# Patient Record
Sex: FEMALE | Race: WHITE | NOT HISPANIC OR LATINO
[De-identification: names, ages, dates, MRNs, and addresses within clinical notes are randomized per-mention and may not be internally consistent; named-entity substitution may affect disease eponyms.]

---

## 2017-06-27 ENCOUNTER — TRANSCRIPTION ENCOUNTER (OUTPATIENT)
Age: 32
End: 2017-06-27

## 2017-06-27 ENCOUNTER — EMERGENCY (EMERGENCY)
Facility: HOSPITAL | Age: 32
LOS: 1 days | Discharge: HOME | End: 2017-06-27
Attending: EMERGENCY MEDICINE | Admitting: EMERGENCY MEDICINE
Payer: COMMERCIAL

## 2017-06-27 VITALS
DIASTOLIC BLOOD PRESSURE: 54 MMHG | TEMPERATURE: 101 F | SYSTOLIC BLOOD PRESSURE: 115 MMHG | OXYGEN SATURATION: 100 % | HEART RATE: 120 BPM | RESPIRATION RATE: 20 BRPM

## 2017-06-27 LAB
ALBUMIN SERPL ELPH-MCNC: 4.6 G/DL — SIGNIFICANT CHANGE UP (ref 3.3–5)
ALP SERPL-CCNC: 59 U/L — SIGNIFICANT CHANGE UP (ref 40–120)
ALT FLD-CCNC: 14 U/L RC — SIGNIFICANT CHANGE UP (ref 10–45)
ANION GAP SERPL CALC-SCNC: 14 MMOL/L — SIGNIFICANT CHANGE UP (ref 5–17)
AST SERPL-CCNC: 21 U/L — SIGNIFICANT CHANGE UP (ref 10–40)
BASOPHILS # BLD AUTO: 0 K/UL — SIGNIFICANT CHANGE UP (ref 0–0.2)
BASOPHILS NFR BLD AUTO: 0.1 % — SIGNIFICANT CHANGE UP (ref 0–2)
BILIRUB SERPL-MCNC: 0.4 MG/DL — SIGNIFICANT CHANGE UP (ref 0.2–1.2)
BUN SERPL-MCNC: 8 MG/DL — SIGNIFICANT CHANGE UP (ref 7–23)
CALCIUM SERPL-MCNC: 9.2 MG/DL — SIGNIFICANT CHANGE UP (ref 8.4–10.5)
CHLORIDE SERPL-SCNC: 101 MMOL/L — SIGNIFICANT CHANGE UP (ref 96–108)
CO2 SERPL-SCNC: 22 MMOL/L — SIGNIFICANT CHANGE UP (ref 22–31)
CREAT SERPL-MCNC: 0.77 MG/DL — SIGNIFICANT CHANGE UP (ref 0.5–1.3)
EOSINOPHIL # BLD AUTO: 0 K/UL — SIGNIFICANT CHANGE UP (ref 0–0.5)
EOSINOPHIL NFR BLD AUTO: 0 % — SIGNIFICANT CHANGE UP (ref 0–6)
GLUCOSE SERPL-MCNC: 138 MG/DL — HIGH (ref 70–99)
HCG UR QL: NEGATIVE — SIGNIFICANT CHANGE UP
HCT VFR BLD CALC: 38.5 % — SIGNIFICANT CHANGE UP (ref 34.5–45)
HGB BLD-MCNC: 13.4 G/DL — SIGNIFICANT CHANGE UP (ref 11.5–15.5)
LIDOCAIN IGE QN: 34 U/L — SIGNIFICANT CHANGE UP (ref 7–60)
LYMPHOCYTES # BLD AUTO: 0.7 K/UL — LOW (ref 1–3.3)
LYMPHOCYTES # BLD AUTO: 7.4 % — LOW (ref 13–44)
MCHC RBC-ENTMCNC: 30.6 PG — SIGNIFICANT CHANGE UP (ref 27–34)
MCHC RBC-ENTMCNC: 34.8 GM/DL — SIGNIFICANT CHANGE UP (ref 32–36)
MCV RBC AUTO: 87.8 FL — SIGNIFICANT CHANGE UP (ref 80–100)
MONOCYTES # BLD AUTO: 0.6 K/UL — SIGNIFICANT CHANGE UP (ref 0–0.9)
MONOCYTES NFR BLD AUTO: 6.3 % — SIGNIFICANT CHANGE UP (ref 2–14)
NEUTROPHILS # BLD AUTO: 7.8 K/UL — HIGH (ref 1.8–7.4)
NEUTROPHILS NFR BLD AUTO: 86.3 % — HIGH (ref 43–77)
PLATELET # BLD AUTO: 167 K/UL — SIGNIFICANT CHANGE UP (ref 150–400)
POTASSIUM SERPL-MCNC: 4.4 MMOL/L — SIGNIFICANT CHANGE UP (ref 3.5–5.3)
POTASSIUM SERPL-SCNC: 4.4 MMOL/L — SIGNIFICANT CHANGE UP (ref 3.5–5.3)
PROT SERPL-MCNC: 7.7 G/DL — SIGNIFICANT CHANGE UP (ref 6–8.3)
RBC # BLD: 4.39 M/UL — SIGNIFICANT CHANGE UP (ref 3.8–5.2)
RBC # FLD: 11.8 % — SIGNIFICANT CHANGE UP (ref 10.3–14.5)
S PYO AG SPEC QL IA: NEGATIVE — SIGNIFICANT CHANGE UP
SODIUM SERPL-SCNC: 137 MMOL/L — SIGNIFICANT CHANGE UP (ref 135–145)
WBC # BLD: 9 K/UL — SIGNIFICANT CHANGE UP (ref 3.8–10.5)
WBC # FLD AUTO: 9 K/UL — SIGNIFICANT CHANGE UP (ref 3.8–10.5)

## 2017-06-27 PROCEDURE — 93010 ELECTROCARDIOGRAM REPORT: CPT | Mod: NC

## 2017-06-27 PROCEDURE — 99284 EMERGENCY DEPT VISIT MOD MDM: CPT | Mod: 25

## 2017-06-27 RX ORDER — ACETAMINOPHEN 500 MG
1000 TABLET ORAL ONCE
Qty: 0 | Refills: 0 | Status: COMPLETED | OUTPATIENT
Start: 2017-06-27 | End: 2017-06-27

## 2017-06-27 RX ORDER — ONDANSETRON 8 MG/1
4 TABLET, FILM COATED ORAL ONCE
Qty: 0 | Refills: 0 | Status: COMPLETED | OUTPATIENT
Start: 2017-06-27 | End: 2017-06-27

## 2017-06-27 RX ORDER — SODIUM CHLORIDE 9 MG/ML
2000 INJECTION INTRAMUSCULAR; INTRAVENOUS; SUBCUTANEOUS ONCE
Qty: 0 | Refills: 0 | Status: COMPLETED | OUTPATIENT
Start: 2017-06-27 | End: 2017-06-27

## 2017-06-27 RX ADMIN — Medication 400 MILLIGRAM(S): at 23:15

## 2017-06-27 RX ADMIN — SODIUM CHLORIDE 2000 MILLILITER(S): 9 INJECTION INTRAMUSCULAR; INTRAVENOUS; SUBCUTANEOUS at 23:15

## 2017-06-27 RX ADMIN — ONDANSETRON 4 MILLIGRAM(S): 8 TABLET, FILM COATED ORAL at 23:15

## 2017-06-27 NOTE — ED PROVIDER NOTE - OBJECTIVE STATEMENT
Attending Massey: 31 y/o female without sig pmh presenting with weakness and syncopal episode. pt states today did not feel well, sore throat and diarrhea. multiple episodes of diarrhea. no reported blood. felt weak and lightheaded and had a syncopal episode. did not hit her head. afterward just felt weak. no back pain. later in the day did have an episodes of vomiting. went to urgent care and had strep testing which was negative and given tyelonl. felt better after tylenol. no black o rbloody stools no recet travel. mom is now having similar symtoms. no h/o cardiac disease.

## 2017-06-27 NOTE — ED PROVIDER NOTE - PROGRESS NOTE DETAILS
Attending Massey: pt felt better after iv hydration and antipyretic. BP improving. able to tolerate po

## 2017-06-27 NOTE — ED PROVIDER NOTE - CARE PLAN
Instructions for follow-up, activity and diet:	Take tylenol 650 mg every 4-6 hours as needed for pain/fever, max daily dose 3250 mg/day.   Keep well hydrated with plenty of water.  May also alternate with sports drinks or juices diluted by half with water.  Avoid excess sugar as this can cause diarrhea.    [place] diet as tolerated  Follow up with your primary doctor in 1-2 days  Return to the emergency department for inability to take in fluids, fever >102 that does not respond to medication, rash, severe vomiting, worsening pain, or if you have any new or changing symptoms or concerns Instructions for follow-up, activity and diet:	Take tylenol 650 mg every 4-6 hours as needed for pain/fever, max daily dose 3250 mg/day.   Keep well hydrated with plenty of water.  May also alternate with sports drinks or juices diluted by half with water.  Avoid excess sugar as this can cause diarrhea.    [place] diet as tolerated  Follow up with your primary doctor in 1-2 days  Return to the emergency department for inability to take in fluids, fever >102 that does not respond to medication, rash, severe vomiting, worsening pain, or if you have any new or changing symptoms or concerns Principal Discharge DX:	Syncope  Instructions for follow-up, activity and diet:	Take tylenol 650 mg every 4-6 hours as needed for pain/fever, max daily dose 3250 mg/day.   Keep well hydrated with plenty of water.  May also alternate with sports drinks or juices diluted by half with water.  Avoid excess sugar as this can cause diarrhea.    [place] diet as tolerated  Follow up with your primary doctor in 1-2 days  Return to the emergency department for inability to take in fluids, fever >102 that does not respond to medication, rash, severe vomiting, worsening pain, or if you have any new or changing symptoms or concerns

## 2017-06-27 NOTE — ED PROVIDER NOTE - PHYSICAL EXAMINATION
Attending Massey: Gen: NAD, heent: atrauamtic, eomi, perrla, mmm, op pink, b/l tonsilar edema and redness, no exudate, handling secretions, uvula midline, neck; nttp, no nuchal rigidity, chest: nttp, no crepitus, cv: rrr, no murmurs, lungs: ctab, abd: soft, nontender, nondistended, no peritoneal signs, +BS, no guarding, ext: wwp, neg homans, skin: no rash, neuro: awake and alert, following commands, speech clear, sensation and strength intact, no focal deficits

## 2017-06-27 NOTE — ED PROVIDER NOTE - MEDICAL DECISION MAKING DETAILS
32F otherwise healthy with nausea/vomiting/diarrhea x1 day with syncope x2 story c/w vasovagal episode.  EKG shows sinus tachycardia.  Will obtain bloodwork, IVF, zofran, reassess. 32F otherwise healthy with nausea/vomiting/diarrhea x1 day with syncope x2 story c/w vasovagal episode.  EKG shows sinus tachycardia.  Will obtain bloodwork, IVF, zofran, reassess.  Attending Massey: 31 y/o previously healthy female h/o one prior syncopal episode in the past presenting after syncopal episode earlier in the day as well as multiple epsiodes of vomiting and diarrhea. upon arrival pt with low grade temperature and mild hypotension. pt with viral symptoms. on exam abd soft and nontender making acute surgical cause of abdominal complaints less likely. pt with erythematous and enlarged tonsils. no visible PTA. strep negative. pt did have syncopal episode. prodrome of lightheadedness and dizziness and history consistent with vasovagal episode. ekg showed no evidence of acute arrythemia. pt given IVF, antipyretic with sig improvement. no h/o IVDA, weight loss or night sweats. plan to d/c with close return precautions

## 2017-06-27 NOTE — ED PROVIDER NOTE - PLAN OF CARE
Take tylenol 650 mg every 4-6 hours as needed for pain/fever, max daily dose 3250 mg/day.   Keep well hydrated with plenty of water.  May also alternate with sports drinks or juices diluted by half with water.  Avoid excess sugar as this can cause diarrhea.    [place] diet as tolerated  Follow up with your primary doctor in 1-2 days  Return to the emergency department for inability to take in fluids, fever >102 that does not respond to medication, rash, severe vomiting, worsening pain, or if you have any new or changing symptoms or concerns

## 2017-06-28 VITALS
HEART RATE: 98 BPM | OXYGEN SATURATION: 98 % | SYSTOLIC BLOOD PRESSURE: 103 MMHG | RESPIRATION RATE: 20 BRPM | DIASTOLIC BLOOD PRESSURE: 62 MMHG

## 2017-06-28 PROCEDURE — 87880 STREP A ASSAY W/OPTIC: CPT

## 2017-06-28 PROCEDURE — 83690 ASSAY OF LIPASE: CPT

## 2017-06-28 PROCEDURE — 80053 COMPREHEN METABOLIC PANEL: CPT

## 2017-06-28 PROCEDURE — 99284 EMERGENCY DEPT VISIT MOD MDM: CPT | Mod: 25

## 2017-06-28 PROCEDURE — 96374 THER/PROPH/DIAG INJ IV PUSH: CPT

## 2017-06-28 PROCEDURE — 87081 CULTURE SCREEN ONLY: CPT

## 2017-06-28 PROCEDURE — 96375 TX/PRO/DX INJ NEW DRUG ADDON: CPT

## 2017-06-28 PROCEDURE — 93005 ELECTROCARDIOGRAM TRACING: CPT

## 2017-06-28 PROCEDURE — 85027 COMPLETE CBC AUTOMATED: CPT

## 2017-06-28 PROCEDURE — 81025 URINE PREGNANCY TEST: CPT

## 2017-06-28 RX ORDER — SODIUM CHLORIDE 9 MG/ML
1000 INJECTION INTRAMUSCULAR; INTRAVENOUS; SUBCUTANEOUS ONCE
Qty: 0 | Refills: 0 | Status: COMPLETED | OUTPATIENT
Start: 2017-06-28 | End: 2017-06-28

## 2017-06-28 RX ADMIN — Medication 1000 MILLIGRAM(S): at 01:36

## 2017-06-28 RX ADMIN — SODIUM CHLORIDE 1000 MILLILITER(S): 9 INJECTION INTRAMUSCULAR; INTRAVENOUS; SUBCUTANEOUS at 01:49

## 2017-06-28 NOTE — ED ADULT NURSE NOTE - OBJECTIVE STATEMENT
pt biba accompanied by family s/p syncopal episode.  as per pt, she had diarrhea this morning causing her to syncopize.  she went to urgent care but continued to have n/v.  pt went to a  later, where she syncopized again.  pt c/o residual h/a.  pt is awake, alert and responsive to all stimuli.  no sob or respiratory distress noted.  pt medicated, as per md's orders.  will continue to monitor.

## 2018-01-11 ENCOUNTER — TRANSCRIPTION ENCOUNTER (OUTPATIENT)
Age: 33
End: 2018-01-11

## 2022-06-08 ENCOUNTER — APPOINTMENT (OUTPATIENT)
Dept: OBGYN | Facility: CLINIC | Age: 37
End: 2022-06-08

## 2022-06-10 ENCOUNTER — ASOB RESULT (OUTPATIENT)
Age: 37
End: 2022-06-10

## 2022-06-10 ENCOUNTER — APPOINTMENT (OUTPATIENT)
Dept: ANTEPARTUM | Facility: CLINIC | Age: 37
End: 2022-06-10
Payer: COMMERCIAL

## 2022-06-10 PROCEDURE — 36416 COLLJ CAPILLARY BLOOD SPEC: CPT

## 2022-06-10 PROCEDURE — 76801 OB US < 14 WKS SINGLE FETUS: CPT

## 2022-06-10 PROCEDURE — 76813 OB US NUCHAL MEAS 1 GEST: CPT

## 2022-08-05 ENCOUNTER — APPOINTMENT (OUTPATIENT)
Dept: ANTEPARTUM | Facility: CLINIC | Age: 37
End: 2022-08-05

## 2022-08-05 ENCOUNTER — ASOB RESULT (OUTPATIENT)
Age: 37
End: 2022-08-05

## 2022-08-05 PROCEDURE — 76811 OB US DETAILED SNGL FETUS: CPT

## 2022-08-25 ENCOUNTER — OUTPATIENT (OUTPATIENT)
Dept: OUTPATIENT SERVICES | Facility: HOSPITAL | Age: 37
LOS: 1 days | Discharge: HOME | End: 2022-08-25

## 2022-08-25 VITALS — DIASTOLIC BLOOD PRESSURE: 54 MMHG | SYSTOLIC BLOOD PRESSURE: 105 MMHG | HEART RATE: 78 BPM

## 2022-08-25 VITALS
TEMPERATURE: 99 F | HEART RATE: 91 BPM | RESPIRATION RATE: 16 BRPM | SYSTOLIC BLOOD PRESSURE: 101 MMHG | DIASTOLIC BLOOD PRESSURE: 58 MMHG

## 2022-08-25 DIAGNOSIS — Z98.890 OTHER SPECIFIED POSTPROCEDURAL STATES: Chronic | ICD-10-CM

## 2022-08-25 DIAGNOSIS — O26.899 OTHER SPECIFIED PREGNANCY RELATED CONDITIONS, UNSPECIFIED TRIMESTER: ICD-10-CM

## 2022-08-25 DIAGNOSIS — Z3A.00 WEEKS OF GESTATION OF PREGNANCY NOT SPECIFIED: ICD-10-CM

## 2022-08-25 LAB
APPEARANCE UR: CLEAR — SIGNIFICANT CHANGE UP
BILIRUB UR-MCNC: NEGATIVE — SIGNIFICANT CHANGE UP
BLD GP AB SCN SERPL QL: NEGATIVE — SIGNIFICANT CHANGE UP
COLOR SPEC: YELLOW — SIGNIFICANT CHANGE UP
DIFF PNL FLD: NEGATIVE — SIGNIFICANT CHANGE UP
GLUCOSE UR QL: NEGATIVE — SIGNIFICANT CHANGE UP
KETONES UR-MCNC: NEGATIVE — SIGNIFICANT CHANGE UP
LEUKOCYTE ESTERASE UR-ACNC: NEGATIVE — SIGNIFICANT CHANGE UP
NITRITE UR-MCNC: NEGATIVE — SIGNIFICANT CHANGE UP
PH UR: 6.5 — SIGNIFICANT CHANGE UP (ref 5–8)
PROT UR-MCNC: ABNORMAL
RH IG SCN BLD-IMP: NEGATIVE — SIGNIFICANT CHANGE UP
RH IG SCN BLD-IMP: NEGATIVE — SIGNIFICANT CHANGE UP
SP GR SPEC: 1.02 — SIGNIFICANT CHANGE UP (ref 1.01–1.05)
UROBILINOGEN FLD QL: SIGNIFICANT CHANGE UP

## 2022-08-25 PROCEDURE — 59025 FETAL NON-STRESS TEST: CPT | Mod: 26

## 2022-08-25 PROCEDURE — 76815 OB US LIMITED FETUS(S): CPT | Mod: 26

## 2022-08-25 PROCEDURE — 76817 TRANSVAGINAL US OBSTETRIC: CPT | Mod: 26

## 2022-08-25 NOTE — OB RN TRIAGE NOTE - NSICDXPASTMEDICALHX_GEN_ALL_CORE_FT
Problem: Anxiety:  Goal: Level of anxiety will decrease  Level of anxiety will decrease   Outcome: Ongoing PAST MEDICAL HISTORY:  No pertinent past medical history

## 2022-08-25 NOTE — OB RN TRIAGE NOTE - FALL HARM RISK - UNIVERSAL INTERVENTIONS
Bed in lowest position, wheels locked, appropriate side rails in place/Call bell, personal items and telephone in reach/Instruct patient to call for assistance before getting out of bed or chair/Non-slip footwear when patient is out of bed/[place] to call system/Physically safe environment - no spills, clutter or unnecessary equipment/Purposeful Proactive Rounding/Room/bathroom lighting operational, light cord in reach

## 2022-08-25 NOTE — OB PROVIDER TRIAGE NOTE - PLAN OF CARE
- Please follow up with your obstetrician at your next scheduled appointment  - Please return for decreased / no fetal movement, vaginal bleeding similar to that of a period, leaking / gush of fluid, regular contractions occurring 4-5 minutes  for one hour or requiring pain medication

## 2022-08-25 NOTE — OB PROVIDER TRIAGE NOTE - NSHPPHYSICALEXAM_GEN_ALL_CORE
T(C): 37.2 (08-25-22 @ 12:45), Max: 37.2 (08-25-22 @ 12:45)  HR: 78 (08-25-22 @ 14:33) (78 - 91)  BP: 105/54 (08-25-22 @ 14:33) (101/58 - 111/54)  RR: 16 (08-25-22 @ 12:45) (16 - 16)  SpO2: --    General: Female sitting comfortably in no apparent distress   Head: Normocephalic. Atraumatic.   Eyes: No discharge, lids normal, conjunctiva normal  Abdomen: Soft, nontender. Gravid. No guarding, rebound, rigidity. No CVAT.   Lungs: No resp distress  SSE: No bleeding or fluid at external genitalia. White physiologic discharge present in vagina.   TAUS: MVP 4.98,   TVUS: CL: 4.55. No dynamic changes or funneling.   Neuro: No facial asymmetry, no slurred speech, moves all 4 extremities  Mood: Alert and lucid, appropriate mood and affect

## 2022-08-25 NOTE — OB PROVIDER TRIAGE NOTE - NSHPLABSRESULTS_GEN_ALL_CORE
Urinalysis Basic - ( 25 Aug 2022 14:00 )    Color: Yellow / Appearance: Clear / S.025 / pH: x  Gluc: x / Ketone: Negative  / Bili: Negative / Urobili: <2 mg/dL   Blood: x / Protein: Trace / Nitrite: Negative   Leuk Esterase: Negative / RBC: x / WBC x   Sq Epi: x / Non Sq Epi: x / Bacteria: x

## 2022-08-25 NOTE — OB PROVIDER TRIAGE NOTE - NSOBPROVIDERNOTE_OBGYN_ALL_OB_FT
Ms. ANN MARIE SHIN is a 37y  23w2d presenting with cramping since last night and 1 episode of spotting this morning without evidence of labor or active bleeding.     CL 4.55 MVP 4.98.      Plan  - UA  - Type and Screen  - Rhogam     Plan d/w Dr. Bermeo Ms. ANN MARIE SHIN is a 37y  23w2d presenting with cramping since last night and 1 episode of spotting this morning without evidence of labor or active bleeding.     AB negative. CL 4.55 MVP 4.98.      Plan  - Patient to receive Rhogam pending second type and screen     Plan d/w Dr. Bermeo Ms. ANN MARIE SHIN is a 37y  23w2d presenting with cramping since last night and 1 episode of spotting this morning without evidence of labor or active bleeding. Patient received Rhogam.     AB negative. CL 4.55 MVP 4.98.      Plan  - Patient to be discharged home with follow up and return precautions  - Please follow up with your obstetrician at your next scheduled appointment  - Please return for decreased / no fetal movement, vaginal bleeding similar to that of a period, leaking / gush of fluid, regular contractions occurring 4-5 minutes  for one hour or requiring pain medication   - Patient educated of plan and demonstrate understanding. All questions answered. Discharge instructions provided and signed.     Plan d/w Dr. Bermeo
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
## 2022-08-28 ENCOUNTER — OUTPATIENT (OUTPATIENT)
Dept: INPATIENT UNIT | Facility: HOSPITAL | Age: 37
LOS: 1 days | Discharge: HOME | End: 2022-08-28

## 2022-08-28 VITALS — HEART RATE: 85 BPM | SYSTOLIC BLOOD PRESSURE: 86 MMHG | DIASTOLIC BLOOD PRESSURE: 53 MMHG

## 2022-08-28 VITALS
DIASTOLIC BLOOD PRESSURE: 54 MMHG | RESPIRATION RATE: 16 BRPM | SYSTOLIC BLOOD PRESSURE: 105 MMHG | TEMPERATURE: 98 F | HEART RATE: 91 BPM

## 2022-08-28 DIAGNOSIS — Z3A.00 WEEKS OF GESTATION OF PREGNANCY NOT SPECIFIED: ICD-10-CM

## 2022-08-28 DIAGNOSIS — Z98.890 OTHER SPECIFIED POSTPROCEDURAL STATES: Chronic | ICD-10-CM

## 2022-08-28 DIAGNOSIS — O26.899 OTHER SPECIFIED PREGNANCY RELATED CONDITIONS, UNSPECIFIED TRIMESTER: ICD-10-CM

## 2022-08-28 PROCEDURE — 99214 OFFICE O/P EST MOD 30 MIN: CPT

## 2022-08-28 PROCEDURE — 76818 FETAL BIOPHYS PROFILE W/NST: CPT | Mod: 26

## 2022-08-28 NOTE — OB PROVIDER TRIAGE NOTE - ADDITIONAL INSTRUCTIONS
Follow up at next scheduled prenatal appointment Monday 8/29  Return for decreased fetal movement, loss of fluid or vaginal bleeding  Increase oral hydration  Kick counts reviewed

## 2022-08-28 NOTE — OB RN TRIAGE NOTE - NSNURSINGINSTR_OBGYN_ALL_OB_FT
pt evaluated for reduced fetal movement.  pt feeling movements, pt d/c to home with instructions given by oziel cavanaugh, plan d/w dr.reyev.

## 2022-08-28 NOTE — OB PROVIDER TRIAGE NOTE - HISTORY OF PRESENT ILLNESS
36y/o  @23.5wks presents with decreased fetal movement since last night,   Denies LOF/VB      Allergies: Dust  Medications: PNV    Medical HX: Denies  Surgical HX: Removal of Spinal Cyst, Liposuction  Denies Etoh/Smoke/Drugs/Psy 36y/o  @23.5wks presents with decreased fetal movement since last evening  around 4pm. Patient waited post Shabbos to arrive to triage. Patient reports good fetal movement since arriving to hospital   Denies LOF/VB    Allergies: Dust  Medications: PNV    Medical HX: Denies  Surgical HX: Removal of Spinal Cyst, Liposuction  Denies Etoh/Smoke/Drugs/Psy

## 2022-08-28 NOTE — OB PROVIDER TRIAGE NOTE - NSICDXPASTSURGICALHX_GEN_ALL_CORE_FT
PAST SURGICAL HISTORY:  H/O cosmetic surgery liposuction    History of removal of cyst from spine & back

## 2022-08-28 NOTE — OB PROVIDER TRIAGE NOTE - PLAN OF CARE
D/C Home  D/W Dr Bermeo  No evidence of acute process  Normal fetal testing  Decreased Fetal Movement  BPP8/8  Follow up at next scheduled prenatal appointment Monday 8/29  Return for decreased fetal movement, loss of fluid or vaginal bleeding  Increase oral hydration  Kick counts reviewed

## 2022-08-28 NOTE — OB PROVIDER TRIAGE NOTE - NSHPPHYSICALEXAM_GEN_ALL_CORE
Vital Signs Last 24 Hrs  T(C): 36.8 (28 Aug 2022 00:59), Max: 36.8 (28 Aug 2022 00:59)  T(F): 98.2 (28 Aug 2022 00:59), Max: 98.2 (28 Aug 2022 00:59)  HR: 86 (28 Aug 2022 01:37) (86 - 91)  BP: 104/51 (28 Aug 2022 01:37) (104/51 - 105/54)  RR: 16 (28 Aug 2022 00:59) (16 - 16)    Assessment reveals VSS  Abdomen soft, NT, gravid  Cat 1 tracing, occasional   Transabdominal Ultrasound-   A&Ox3  Lungs- clear bilateral  Heart- normal rate and regular rhythm  Extremities- Warm, Dry, no edema present, good pulses       PLAN: Vital Signs Last 24 Hrs  T(C): 36.8 (28 Aug 2022 00:59), Max: 36.8 (28 Aug 2022 00:59)  T(F): 98.2 (28 Aug 2022 00:59), Max: 98.2 (28 Aug 2022 00:59)  HR: 86 (28 Aug 2022 01:37) (86 - 91)  BP: 104/51 (28 Aug 2022 01:37) (104/51 - 105/54)  RR: 16 (28 Aug 2022 00:59) (16 - 16)    Assessment reveals VSS  Abdomen soft, NT, gravid  Cat 1 tracing, occasional   Transabdominal Ultrasound- breech, ant placenta, mvp: 5.75, bpp8/8  A&Ox3  Lungs- clear bilateral  Heart- normal rate and regular rhythm  Extremities- Warm, Dry, no edema present, good pulses       PLAN: D/C Home

## 2022-08-28 NOTE — OB RN TRIAGE NOTE - NSICDXPASTSURGICALHX_GEN_ALL_CORE_FT
PAST SURGICAL HISTORY:  History of removal of cyst from spine & back     PAST SURGICAL HISTORY:  H/O cosmetic surgery liposuction    History of removal of cyst from spine & back

## 2022-08-28 NOTE — OB RN TRIAGE NOTE - FALL HARM RISK - UNIVERSAL INTERVENTIONS
Bed in lowest position, wheels locked, appropriate side rails in place/Call bell, personal items and telephone in reach/Instruct patient to call for assistance before getting out of bed or chair/Non-slip footwear when patient is out of bed/[place] to call system/Physically safe environment - no spills, clutter or unnecessary equipment/Purposeful Proactive Rounding/Room/bathroom lighting operational, light cord in reach

## 2022-08-28 NOTE — OB PROVIDER TRIAGE NOTE - NS_OBGYNHISTORY_OBGYN_ALL_OB_FT
GYN: Denies  OB:   sab x1 2017  sabx1 no d&c    AP course uncomplicated  -GBS unknown GYN: Jim  OB:   sab x1 2017  sabx1     AP course uncomplicated  -GBS unknown  -Rh negative, received Rhogam last week

## 2022-11-03 ENCOUNTER — APPOINTMENT (OUTPATIENT)
Dept: ANTEPARTUM | Facility: CLINIC | Age: 37
End: 2022-11-03

## 2022-11-03 ENCOUNTER — ASOB RESULT (OUTPATIENT)
Age: 37
End: 2022-11-03

## 2022-11-03 PROCEDURE — 76819 FETAL BIOPHYS PROFIL W/O NST: CPT | Mod: 59

## 2022-11-03 PROCEDURE — 76816 OB US FOLLOW-UP PER FETUS: CPT

## 2022-12-13 ENCOUNTER — INPATIENT (INPATIENT)
Facility: HOSPITAL | Age: 37
LOS: 2 days | Discharge: HOME | End: 2022-12-16
Attending: OBSTETRICS & GYNECOLOGY | Admitting: OBSTETRICS & GYNECOLOGY
Payer: COMMERCIAL

## 2022-12-13 VITALS
RESPIRATION RATE: 17 BRPM | TEMPERATURE: 99 F | SYSTOLIC BLOOD PRESSURE: 104 MMHG | DIASTOLIC BLOOD PRESSURE: 54 MMHG | HEART RATE: 88 BPM

## 2022-12-13 DIAGNOSIS — Z98.890 OTHER SPECIFIED POSTPROCEDURAL STATES: Chronic | ICD-10-CM

## 2022-12-13 DIAGNOSIS — O09.513 SUPERVISION OF ELDERLY PRIMIGRAVIDA, THIRD TRIMESTER: ICD-10-CM

## 2022-12-13 DIAGNOSIS — O26.899 OTHER SPECIFIED PREGNANCY RELATED CONDITIONS, UNSPECIFIED TRIMESTER: ICD-10-CM

## 2022-12-13 LAB
BASOPHILS # BLD AUTO: 0.02 K/UL — SIGNIFICANT CHANGE UP (ref 0–0.2)
BASOPHILS NFR BLD AUTO: 0.2 % — SIGNIFICANT CHANGE UP (ref 0–2)
BLD GP AB SCN SERPL QL: NEGATIVE — SIGNIFICANT CHANGE UP
COVID-19 SPIKE DOMAIN AB INTERP: POSITIVE
COVID-19 SPIKE DOMAIN ANTIBODY RESULT: 15.6 U/ML — HIGH
EOSINOPHIL # BLD AUTO: 0.1 K/UL — SIGNIFICANT CHANGE UP (ref 0–0.5)
EOSINOPHIL NFR BLD AUTO: 1.1 % — SIGNIFICANT CHANGE UP (ref 0–6)
HCT VFR BLD CALC: 35.6 % — SIGNIFICANT CHANGE UP (ref 34.5–45)
HGB BLD-MCNC: 11.7 G/DL — SIGNIFICANT CHANGE UP (ref 11.5–15.5)
HIV 1+2 AB+HIV1 P24 AG SERPL QL IA: SIGNIFICANT CHANGE UP
IANC: 6.67 K/UL — SIGNIFICANT CHANGE UP (ref 1.8–7.4)
IMM GRANULOCYTES NFR BLD AUTO: 0.7 % — SIGNIFICANT CHANGE UP (ref 0–0.9)
LYMPHOCYTES # BLD AUTO: 1.91 K/UL — SIGNIFICANT CHANGE UP (ref 1–3.3)
LYMPHOCYTES # BLD AUTO: 20.8 % — SIGNIFICANT CHANGE UP (ref 13–44)
MCHC RBC-ENTMCNC: 29.4 PG — SIGNIFICANT CHANGE UP (ref 27–34)
MCHC RBC-ENTMCNC: 32.9 GM/DL — SIGNIFICANT CHANGE UP (ref 32–36)
MCV RBC AUTO: 89.4 FL — SIGNIFICANT CHANGE UP (ref 80–100)
MONOCYTES # BLD AUTO: 0.43 K/UL — SIGNIFICANT CHANGE UP (ref 0–0.9)
MONOCYTES NFR BLD AUTO: 4.7 % — SIGNIFICANT CHANGE UP (ref 2–14)
NEUTROPHILS # BLD AUTO: 6.67 K/UL — SIGNIFICANT CHANGE UP (ref 1.8–7.4)
NEUTROPHILS NFR BLD AUTO: 72.5 % — SIGNIFICANT CHANGE UP (ref 43–77)
NRBC # BLD: 0 /100 WBCS — SIGNIFICANT CHANGE UP (ref 0–0)
NRBC # FLD: 0 K/UL — SIGNIFICANT CHANGE UP (ref 0–0)
PLATELET # BLD AUTO: 217 K/UL — SIGNIFICANT CHANGE UP (ref 150–400)
RBC # BLD: 3.98 M/UL — SIGNIFICANT CHANGE UP (ref 3.8–5.2)
RBC # FLD: 13.4 % — SIGNIFICANT CHANGE UP (ref 10.3–14.5)
RH IG SCN BLD-IMP: NEGATIVE — SIGNIFICANT CHANGE UP
RUBV IGG SER-ACNC: 1.6 INDEX — SIGNIFICANT CHANGE UP
RUBV IGG SER-IMP: POSITIVE — SIGNIFICANT CHANGE UP
SARS-COV-2 IGG+IGM SERPL QL IA: 15.6 U/ML — HIGH
SARS-COV-2 IGG+IGM SERPL QL IA: POSITIVE
SARS-COV-2 RNA SPEC QL NAA+PROBE: SIGNIFICANT CHANGE UP
T PALLIDUM AB TITR SER: NEGATIVE — SIGNIFICANT CHANGE UP
WBC # BLD: 9.19 K/UL — SIGNIFICANT CHANGE UP (ref 3.8–10.5)
WBC # FLD AUTO: 9.19 K/UL — SIGNIFICANT CHANGE UP (ref 3.8–10.5)

## 2022-12-13 RX ORDER — CHLORHEXIDINE GLUCONATE 213 G/1000ML
1 SOLUTION TOPICAL ONCE
Refills: 0 | Status: DISCONTINUED | OUTPATIENT
Start: 2022-12-13 | End: 2022-12-13

## 2022-12-13 RX ORDER — AMPICILLIN TRIHYDRATE 250 MG
1 CAPSULE ORAL EVERY 4 HOURS
Refills: 0 | Status: DISCONTINUED | OUTPATIENT
Start: 2022-12-13 | End: 2022-12-13

## 2022-12-13 RX ORDER — CHLORHEXIDINE GLUCONATE 213 G/1000ML
1 SOLUTION TOPICAL ONCE
Refills: 0 | Status: DISCONTINUED | OUTPATIENT
Start: 2022-12-13 | End: 2022-12-14

## 2022-12-13 RX ORDER — AMPICILLIN TRIHYDRATE 250 MG
2 CAPSULE ORAL ONCE
Refills: 0 | Status: COMPLETED | OUTPATIENT
Start: 2022-12-13 | End: 2022-12-13

## 2022-12-13 RX ORDER — SODIUM CHLORIDE 9 MG/ML
1000 INJECTION, SOLUTION INTRAVENOUS
Refills: 0 | Status: DISCONTINUED | OUTPATIENT
Start: 2022-12-13 | End: 2022-12-13

## 2022-12-13 RX ORDER — OXYTOCIN 10 UNIT/ML
333.33 VIAL (ML) INJECTION
Qty: 20 | Refills: 0 | Status: DISCONTINUED | OUTPATIENT
Start: 2022-12-13 | End: 2022-12-15

## 2022-12-13 RX ORDER — AMPICILLIN TRIHYDRATE 250 MG
1 CAPSULE ORAL EVERY 4 HOURS
Refills: 0 | Status: DISCONTINUED | OUTPATIENT
Start: 2022-12-13 | End: 2022-12-14

## 2022-12-13 RX ORDER — SODIUM CHLORIDE 9 MG/ML
1000 INJECTION, SOLUTION INTRAVENOUS
Refills: 0 | Status: DISCONTINUED | OUTPATIENT
Start: 2022-12-13 | End: 2022-12-14

## 2022-12-13 RX ADMIN — Medication 108 GRAM(S): at 17:09

## 2022-12-13 RX ADMIN — Medication 108 GRAM(S): at 21:08

## 2022-12-13 RX ADMIN — Medication 108 GRAM(S): at 13:02

## 2022-12-13 RX ADMIN — Medication 200 GRAM(S): at 09:00

## 2022-12-13 RX ADMIN — SODIUM CHLORIDE 125 MILLILITER(S): 9 INJECTION, SOLUTION INTRAVENOUS at 08:45

## 2022-12-13 NOTE — OB PROVIDER TRIAGE NOTE - HISTORY OF PRESENT ILLNESS
38 y/o  at 39 weeks presents for scheduled induction today for **. Patient reports contractions every **  GBS    Med Hx:   Meds:  OBGYN Hx: denies hx of fibroids, ovarian cysts, abnormal pap smear, STDs  Surg Hx:  Allergies: 38 y/o  at 39 weeks presents for scheduled induction today for AMA. Patient reports irregular cramping. Reports good fetal movement. Denies leakage of fluid or vaginal bleeding. Denies any AP issues or fetal issues.  GBS positive     Med Hx: denies  Meds: ASA  OBGYN Hx: SAB x 2  denies hx of fibroids, ovarian cysts, abnormal pap smear, STDs  Surg Hx: liposuction, spine cysts s/p excision  Allergies: NKDA 38 y/o  at 39 weeks presents for scheduled induction today for AMA. Patient reports irregular cramping. Reports good fetal movement. Denies leakage of fluid or vaginal bleeding. Denies any AP issues or fetal issues.  GBS positive     Med Hx: denies  Meds: ASA  OBGYN Hx: SAB x 2  denies hx of fibroids, ovarian cysts, abnormal pap smear, STDs  Surg Hx: liposuction, cysts excision on spine and lower back  Allergies: NKDA 36 y/o  at 39 weeks presents for scheduled induction today for AMA. Patient reports irregular cramping. Reports good fetal movement. Denies leakage of fluid or vaginal bleeding. Patient was given Rhogam during this pregnancy. Denies any AP issues or fetal issues.  GBS positive     Med Hx: denies  Meds: ASA  OBGYN Hx: SAB x 2  denies hx of fibroids, ovarian cysts, abnormal pap smear, STDs  Surg Hx: liposuction, cysts excision on spine and lower back  Allergies: NKDA

## 2022-12-13 NOTE — OB PROVIDER TRIAGE NOTE - NSHPPHYSICALEXAM_GEN_ALL_CORE
A&O x 3  Lungs: CTAB  Abd: gravid, soft nontender to palpation  EFM: baseline, moderate variability, + accels, no decels, ctx q  SSE: pooling, nitrazine, fern  SVE:   TAS: presentation, placenta, BPP RAMESH Vital Signs Last 24 Hrs  T(C): 37.2 (13 Dec 2022 05:34), Max: 37.2 (13 Dec 2022 05:25)  T(F): 98.96 (13 Dec 2022 05:34), Max: 99 (13 Dec 2022 05:25)  HR: 88 (13 Dec 2022 05:38) (88 - 88)  BP: 104/54 (13 Dec 2022 05:38) (104/54 - 104/54)  BP(mean): --  RR: 17 (13 Dec 2022 05:25) (17 - 17)  SpO2: --        A&O x 3  Lungs: CTAB  Abd: gravid, soft nontender to palpation  EFM: baseline 145, moderate variability, + accels, no decels, no ctx  SVE: 0/50/-3  TAS: vertex presentation Vital Signs Last 24 Hrs  T(C): 37.2 (13 Dec 2022 05:34), Max: 37.2 (13 Dec 2022 05:25)  T(F): 98.96 (13 Dec 2022 05:34), Max: 99 (13 Dec 2022 05:25)  HR: 88 (13 Dec 2022 05:38) (88 - 88)  BP: 104/54 (13 Dec 2022 05:38) (104/54 - 104/54)  BP(mean): --  RR: 17 (13 Dec 2022 05:25) (17 - 17)  SpO2: --    A&O x 3  Lungs: CTAB  Abd: gravid, soft nontender to palpation  EFM: baseline 145, moderate variability, + accels, no decels, no ctx  SVE: 0/50/-3  TAS: vertex presentation

## 2022-12-13 NOTE — OB PROVIDER TRIAGE NOTE - NSOBPROVIDERNOTE_OBGYN_ALL_OB_FT
- Admit to L&D for induction with **  - ** presentation  - GBS   - Admission labs done  - Consent forms signed  - Covid swab done  - Case discussed with  ** 38 y/o  at 39 weeks here for scheduled induction for AMA  - Admit to L&D, method of induction to be determined by Dr. Thakur after change of shift as per Dr. Foote  - Vertex presentation  - GBS positive, for ampicillin  - Admission labs done  - Consent forms signed  - Covid swab done  - Case discussed with Dr. Foote 38 y/o  at 39 weeks here for scheduled induction for AMA  - Admit to L&D, method of induction to be determined by Dr. Thakur after change of shift as per Dr. Foote  - Vertex presentation  - GBS positive, for ampicillin  - Admission labs done  - Consent forms signed  - Covid swab done  - Case discussed with Dr. Foote    0700  - Dr. Thakur called and case discussed, patient for PO cytotec

## 2022-12-13 NOTE — OB PROVIDER H&P - HISTORY OF PRESENT ILLNESS
38 y/o  at 39 weeks presents for scheduled induction today for AMA. Patient reports irregular cramping. Reports good fetal movement. Denies leakage of fluid or vaginal bleeding. Denies any AP issues or fetal issues.  GBS positive     Med Hx: denies  Meds: ASA  OBGYN Hx: SAB x 2  denies hx of fibroids, ovarian cysts, abnormal pap smear, STDs  Surg Hx: liposuction, cysts excision on spine and lower back  Allergies: NKDA 36 y/o  at 39 weeks presents for scheduled induction today for AMA. Patient reports irregular cramping. Reports good fetal movement. Denies leakage of fluid or vaginal bleeding. Patient was given Rhogam during this pregnancy. Denies any AP issues or fetal issues.  GBS positive     Med Hx: denies  Meds: ASA  OBGYN Hx: SAB x 2  denies hx of fibroids, ovarian cysts, abnormal pap smear, STDs  Surg Hx: liposuction, cysts excision on spine and lower back  Allergies: NKDA

## 2022-12-13 NOTE — OB RN TRIAGE NOTE - NS_OBGYNHISTORY_OBGYN_ALL_OB_FT
sab x1 2017  miscarriage x1 no d&c miscarriage x2 no d&c miscarriage x2 no d&c    patient unsure of location of herniated disc

## 2022-12-13 NOTE — OB RN TRIAGE NOTE - NSICDXPASTMEDICALHX_GEN_ALL_CORE_FT
PAST MEDICAL HISTORY:  No pertinent past medical history      PAST MEDICAL HISTORY:  Lumbar herniated disc

## 2022-12-13 NOTE — OB PROVIDER H&P - ASSESSMENT
38 y/o  at 39 weeks here for scheduled induction for AMA  - Admit to L&D, method of induction to be determined by Dr. Thakur after change of shift as per Dr. Foote  - Vertex presentation  - GBS positive, for ampicillin  - Admission labs done  - Consent forms signed  - Covid swab done  - Case discussed with Dr. Foote 36 y/o  at 39 weeks here for scheduled induction for AMA  - Admit to L&D, method of induction to be determined by Dr. Thakur after change of shift as per Dr. Foote  - Vertex presentation  - GBS positive, for ampicillin  - Admission labs done  - Consent forms signed  - Covid swab done  - Case discussed with Dr. Foote    0700  - Dr. Thakur called and case discussed, patient for PO cytotec 36 y/o  at 39 weeks here for scheduled induction for AMA  - Admit to L&D, method of induction to be determined by Dr. Thakur after change of shift as per Dr. Foote  - Vertex presentation  - GBS positive, for ampicillin  - Admission labs done  - Consent forms signed  - Covid swab done  - Case discussed with Dr. Foote  patient endorsed to incoming ob on call   was scheduled for iol for RR due to AMA  agreed with ACP note,iol method as per incoming obgyn on call    0700  - Dr. Thakur called and case discussed, patient for PO cytotec

## 2022-12-13 NOTE — OB PROVIDER H&P - NSHPPHYSICALEXAM_GEN_ALL_CORE
Vital Signs Last 24 Hrs  T(C): 37.2 (13 Dec 2022 05:34), Max: 37.2 (13 Dec 2022 05:25)  T(F): 98.96 (13 Dec 2022 05:34), Max: 99 (13 Dec 2022 05:25)  HR: 88 (13 Dec 2022 05:38) (88 - 88)  BP: 104/54 (13 Dec 2022 05:38) (104/54 - 104/54)  BP(mean): --  RR: 17 (13 Dec 2022 05:25) (17 - 17)  SpO2: --    A&O x 3  Lungs: CTAB  Abd: gravid, soft nontender to palpation  EFM: baseline 145, moderate variability, + accels, no decels, no ctx  SVE: 0/50/-3  TAS: vertex presentation

## 2022-12-14 DIAGNOSIS — O61.9 FAILED INDUCTION OF LABOR, UNSPECIFIED: ICD-10-CM

## 2022-12-14 RX ORDER — OXYTOCIN 10 UNIT/ML
2 VIAL (ML) INJECTION
Qty: 30 | Refills: 0 | Status: DISCONTINUED | OUTPATIENT
Start: 2022-12-14 | End: 2022-12-15

## 2022-12-14 RX ORDER — SODIUM CHLORIDE 9 MG/ML
1000 INJECTION, SOLUTION INTRAVENOUS ONCE
Refills: 0 | Status: DISCONTINUED | OUTPATIENT
Start: 2022-12-14 | End: 2022-12-15

## 2022-12-14 RX ADMIN — Medication 108 GRAM(S): at 22:15

## 2022-12-14 RX ADMIN — Medication 108 GRAM(S): at 18:09

## 2022-12-14 RX ADMIN — Medication 108 GRAM(S): at 14:22

## 2022-12-14 RX ADMIN — Medication 108 GRAM(S): at 09:59

## 2022-12-14 RX ADMIN — Medication 108 GRAM(S): at 01:16

## 2022-12-14 RX ADMIN — Medication 108 GRAM(S): at 05:07

## 2022-12-14 RX ADMIN — Medication 2 MILLIUNIT(S)/MIN: at 20:46

## 2022-12-14 NOTE — CHART NOTE - NSCHARTNOTEFT_GEN_A_CORE
Attending Note  38 yo P0 EDC 22 with IUP 39 0/7wks ga presents to L&d for risk reducing induction of labor due to AMA.  Patient denies painful contractions, vaginal bleeding, dysuria, leakage of fluid, etc. Reports active fetal movements. Patients prenatal care significant for elevated GCT and normal GTT.    VS  T36.4  p 88  R 16  Bp 104/54      Heent nl  abd soft gravid, AGA EFW 7.5lbs  toco no ctx  's reactive category I  labs: HH 11.7/35.6  plts 217K   VE closed,/50/-3 medium consistency vtx  GBS+     A: 38 yo P0 with IUP 39 wks ga AMA, for IOL  P: The patient was counseled extensively during prenatal visit and current admission regarding risks and benefits of IOL.  Risks and benefits of IOL at 39wks vs waiting for more favorable cervix for induction vs waiting for spontaneous labor.  Patient discussed with  and family and decided to proceed with IOL.  Risks include but not limited to failed induction, long labor, arrest disorder leading to delivery by  section. The patient verbalized understanding.  Ampicillin for + GBS  Admit to L&D for IOL with PO cytotec  Anticipate   MBeauvil
PA Note    1st dose of vaginal cytotec placed    cont efm/toco  dw Dr Aracelis vera pa
PA Note    seen & examined for placement of cervical balloon  comfortable with epidural    VS  T(C): 36.7 (12-14-22 @ 10:00)  HR: 74 (12-14-22 @ 14:11)  BP: 107/57 (12-14-22 @ 14:02)  RR: --  SpO2: 99% (12-14-22 @ 14:06)    140/mod angel/+accels/no decels  [place] q 3-5min  0.5/50/-3 cook cervical balloon placed without complication - 60ccs instilled in both the uterine and vaginal balloons  patient tolerated well    cont efm/toco  s/p PO cytotec course  will discuss with Dr Hsu the plan for continued augmentation  consider vaginal cytotec or buccal cytotec  chuy orr
PA note    seen & examined for pain, requesting epidural    VS  T(C): 36.4 (12-14-22 @ 06:00)  HR: 82 (12-14-22 @ 08:03)  BP: 96/49 (12-14-22 @ 08:03)  RR: 16 (12-13-22 @ 10:15)  SpO2: --    140/mod nagel/+accels/no decels  [place] q 3-4min  0.5/50/-3    cont efm/toco  epidural for pain  cont PO cytotec  consider cervical balloon s/p PO course  dw Dr Aracelis orr
Attending note    Patient evaluated at bedside. Patient is s/p two doses of oral cytotec. Patient reports some cramps. Denies painful contractions, vaginal bleeding, dysuria. Reports active fetal movements.  FHR tracing reviewed and category I irregular low amplitude contractions noted. Will continue with IOL. All questions and concerns addressed at bedside. [email or web address]

## 2022-12-15 LAB
ANION GAP SERPL CALC-SCNC: 8 MMOL/L — SIGNIFICANT CHANGE UP (ref 7–14)
BUN SERPL-MCNC: 4 MG/DL — LOW (ref 7–23)
CALCIUM SERPL-MCNC: 8.7 MG/DL — SIGNIFICANT CHANGE UP (ref 8.4–10.5)
CHLORIDE SERPL-SCNC: 106 MMOL/L — SIGNIFICANT CHANGE UP (ref 98–107)
CO2 SERPL-SCNC: 22 MMOL/L — SIGNIFICANT CHANGE UP (ref 22–31)
CREAT SERPL-MCNC: 0.51 MG/DL — SIGNIFICANT CHANGE UP (ref 0.5–1.3)
EGFR: 122 ML/MIN/1.73M2 — SIGNIFICANT CHANGE UP
GLUCOSE SERPL-MCNC: 89 MG/DL — SIGNIFICANT CHANGE UP (ref 70–99)
KLEIHAUER-BETKE CALCULATION: 0.04 % — SIGNIFICANT CHANGE UP
MAGNESIUM SERPL-MCNC: 1.7 MG/DL — SIGNIFICANT CHANGE UP (ref 1.6–2.6)
PHOSPHATE SERPL-MCNC: 3.4 MG/DL — SIGNIFICANT CHANGE UP (ref 2.5–4.5)
POTASSIUM SERPL-MCNC: 3.7 MMOL/L — SIGNIFICANT CHANGE UP (ref 3.5–5.3)
POTASSIUM SERPL-SCNC: 3.7 MMOL/L — SIGNIFICANT CHANGE UP (ref 3.5–5.3)
SODIUM SERPL-SCNC: 136 MMOL/L — SIGNIFICANT CHANGE UP (ref 135–145)

## 2022-12-15 PROCEDURE — 88307 TISSUE EXAM BY PATHOLOGIST: CPT | Mod: 26

## 2022-12-15 RX ORDER — AMPICILLIN TRIHYDRATE 250 MG
2000 CAPSULE ORAL ONCE
Refills: 0 | Status: COMPLETED | OUTPATIENT
Start: 2022-12-15 | End: 2022-12-15

## 2022-12-15 RX ORDER — HYDROCORTISONE 1 %
1 OINTMENT (GRAM) TOPICAL EVERY 6 HOURS
Refills: 0 | Status: DISCONTINUED | OUTPATIENT
Start: 2022-12-15 | End: 2022-12-16

## 2022-12-15 RX ORDER — ACETAMINOPHEN 500 MG
975 TABLET ORAL
Refills: 0 | Status: DISCONTINUED | OUTPATIENT
Start: 2022-12-15 | End: 2022-12-16

## 2022-12-15 RX ORDER — GENTAMICIN SULFATE 40 MG/ML
400 VIAL (ML) INJECTION ONCE
Refills: 0 | Status: COMPLETED | OUTPATIENT
Start: 2022-12-15 | End: 2022-12-15

## 2022-12-15 RX ORDER — KETOROLAC TROMETHAMINE 30 MG/ML
30 SYRINGE (ML) INJECTION ONCE
Refills: 0 | Status: DISCONTINUED | OUTPATIENT
Start: 2022-12-15 | End: 2022-12-15

## 2022-12-15 RX ORDER — AMPICILLIN TRIHYDRATE 250 MG
1 CAPSULE ORAL EVERY 4 HOURS
Refills: 0 | Status: DISCONTINUED | OUTPATIENT
Start: 2022-12-15 | End: 2022-12-15

## 2022-12-15 RX ORDER — CHLORHEXIDINE GLUCONATE 213 G/1000ML
1 SOLUTION TOPICAL ONCE
Refills: 0 | Status: DISCONTINUED | OUTPATIENT
Start: 2022-12-15 | End: 2022-12-15

## 2022-12-15 RX ORDER — ACETAMINOPHEN 500 MG
1000 TABLET ORAL ONCE
Refills: 0 | Status: COMPLETED | OUTPATIENT
Start: 2022-12-15 | End: 2022-12-15

## 2022-12-15 RX ORDER — OXYCODONE HYDROCHLORIDE 5 MG/1
5 TABLET ORAL ONCE
Refills: 0 | Status: DISCONTINUED | OUTPATIENT
Start: 2022-12-15 | End: 2022-12-16

## 2022-12-15 RX ORDER — AER TRAVELER 0.5 G/1
1 SOLUTION RECTAL; TOPICAL EVERY 4 HOURS
Refills: 0 | Status: DISCONTINUED | OUTPATIENT
Start: 2022-12-15 | End: 2022-12-16

## 2022-12-15 RX ORDER — IBUPROFEN 200 MG
600 TABLET ORAL EVERY 6 HOURS
Refills: 0 | Status: DISCONTINUED | OUTPATIENT
Start: 2022-12-15 | End: 2022-12-16

## 2022-12-15 RX ORDER — LANOLIN
1 OINTMENT (GRAM) TOPICAL EVERY 6 HOURS
Refills: 0 | Status: DISCONTINUED | OUTPATIENT
Start: 2022-12-15 | End: 2022-12-16

## 2022-12-15 RX ORDER — SIMETHICONE 80 MG/1
80 TABLET, CHEWABLE ORAL EVERY 4 HOURS
Refills: 0 | Status: DISCONTINUED | OUTPATIENT
Start: 2022-12-15 | End: 2022-12-16

## 2022-12-15 RX ORDER — MAGNESIUM HYDROXIDE 400 MG/1
30 TABLET, CHEWABLE ORAL
Refills: 0 | Status: DISCONTINUED | OUTPATIENT
Start: 2022-12-15 | End: 2022-12-16

## 2022-12-15 RX ORDER — IBUPROFEN 200 MG
600 TABLET ORAL EVERY 6 HOURS
Refills: 0 | Status: COMPLETED | OUTPATIENT
Start: 2022-12-15 | End: 2023-11-13

## 2022-12-15 RX ORDER — OXYTOCIN 10 UNIT/ML
41.67 VIAL (ML) INJECTION
Qty: 20 | Refills: 0 | Status: DISCONTINUED | OUTPATIENT
Start: 2022-12-15 | End: 2022-12-15

## 2022-12-15 RX ORDER — SODIUM CHLORIDE 9 MG/ML
3 INJECTION INTRAMUSCULAR; INTRAVENOUS; SUBCUTANEOUS EVERY 8 HOURS
Refills: 0 | Status: DISCONTINUED | OUTPATIENT
Start: 2022-12-15 | End: 2022-12-16

## 2022-12-15 RX ORDER — DIBUCAINE 1 %
1 OINTMENT (GRAM) RECTAL EVERY 6 HOURS
Refills: 0 | Status: DISCONTINUED | OUTPATIENT
Start: 2022-12-15 | End: 2022-12-16

## 2022-12-15 RX ORDER — TETANUS TOXOID, REDUCED DIPHTHERIA TOXOID AND ACELLULAR PERTUSSIS VACCINE, ADSORBED 5; 2.5; 8; 8; 2.5 [IU]/.5ML; [IU]/.5ML; UG/.5ML; UG/.5ML; UG/.5ML
0.5 SUSPENSION INTRAMUSCULAR ONCE
Refills: 0 | Status: DISCONTINUED | OUTPATIENT
Start: 2022-12-15 | End: 2022-12-16

## 2022-12-15 RX ORDER — SODIUM CHLORIDE 9 MG/ML
1000 INJECTION, SOLUTION INTRAVENOUS
Refills: 0 | Status: DISCONTINUED | OUTPATIENT
Start: 2022-12-15 | End: 2022-12-15

## 2022-12-15 RX ORDER — BENZOCAINE 10 %
1 GEL (GRAM) MUCOUS MEMBRANE EVERY 6 HOURS
Refills: 0 | Status: DISCONTINUED | OUTPATIENT
Start: 2022-12-15 | End: 2022-12-16

## 2022-12-15 RX ORDER — OXYCODONE HYDROCHLORIDE 5 MG/1
5 TABLET ORAL
Refills: 0 | Status: DISCONTINUED | OUTPATIENT
Start: 2022-12-15 | End: 2022-12-16

## 2022-12-15 RX ORDER — PRAMOXINE HYDROCHLORIDE 150 MG/15G
1 AEROSOL, FOAM RECTAL EVERY 4 HOURS
Refills: 0 | Status: DISCONTINUED | OUTPATIENT
Start: 2022-12-15 | End: 2022-12-16

## 2022-12-15 RX ORDER — DIPHENHYDRAMINE HCL 50 MG
25 CAPSULE ORAL EVERY 6 HOURS
Refills: 0 | Status: DISCONTINUED | OUTPATIENT
Start: 2022-12-15 | End: 2022-12-16

## 2022-12-15 RX ADMIN — Medication 108 GRAM(S): at 02:33

## 2022-12-15 RX ADMIN — Medication 500 MILLIGRAM(S): at 06:52

## 2022-12-15 RX ADMIN — Medication 30 MILLIGRAM(S): at 06:12

## 2022-12-15 RX ADMIN — Medication 600 MILLIGRAM(S): at 17:06

## 2022-12-15 RX ADMIN — Medication 200 MILLIGRAM(S): at 04:00

## 2022-12-15 RX ADMIN — Medication 400 MILLIGRAM(S): at 04:00

## 2022-12-15 RX ADMIN — Medication 600 MILLIGRAM(S): at 16:18

## 2022-12-15 NOTE — OB RN DELIVERY SUMMARY - NSSELHIDDEN_OBGYN_ALL_OB_FT
[NS_DeliveryAttending1_OBGYN_ALL_OB_FT:[id number]],[NS_DeliveryRN_OBGYN_ALL_OB_FT:[id number]]

## 2022-12-15 NOTE — OB RN DELIVERY SUMMARY - NS_SEPSISRSKCALC_OBGYN_ALL_OB_FT
EOS calculated successfully. EOS Risk Factor: 0.5/1000 live births ([facility] national incidence); GA=39w2d; Temp=101.12; ROM=5.35; GBS='Positive'; Antibiotics='GBS specific antibiotics > 2 hrs prior to birth'

## 2022-12-15 NOTE — OB PROVIDER DELIVERY SUMMARY - NSLOWPPHRISK_OBGYN_A_OB
No previous uterine incision/Valadez Pregnancy/Less than or equal to 4 previous vaginal births/No known bleeding disorder/No history of postpartum hemorrhage/No other PPH risks indicated

## 2022-12-15 NOTE — OB PROVIDER DELIVERY SUMMARY - NSSELHIDDEN_OBGYN_ALL_OB_FT
[NS_DeliveryAttending1_OBGYN_ALL_OB_FT:[id number]==],[NS_DeliveryRN_OBGYN_ALL_OB_FT:[id number]=]

## 2022-12-15 NOTE — OB NEONATOLOGY/PEDIATRICIAN DELIVERY SUMMARY - NSPEDSNEONOTESA_OBGYN_ALL_OB_FT
Peds called for Category II tracing and maternal temp of 38.4C. 39wk female born via  to a 38 y/o  blood type AB- mother. No significant maternal or prenatal history. PNL -/-/NR/I, GBS + on  s/p AMp x9. SROM at 2300 () with clear fluids, approx. 5 hrs. Delayed cord clamping at 60 second. Baby emerged vigorous, crying, was w/d/s/s with APGARS of 8/9 . Mom plans to initiate breastfeeding and formula feed, declines Hep B vaccine.  EOS 0.60. Maternal COVID negative. Peds called for Category II tracing and maternal temp of 38.4C. 39wk female born via  to a 40 y/o  blood type AB- mother. No significant maternal or prenatal history. PNL -/-/NR/I, GBS + on  s/p AMp x9. SROM at 2300 () with clear fluids, approx. 5 hrs. Delayed cord clamping at 60 second. Baby emerged vigorous, crying, was w/d/s/s with APGARS of 9/9 . Mom plans to initiate breastfeeding and formula feed, declines Hep B vaccine.  EOS 0.60. Maternal COVID negative.

## 2022-12-15 NOTE — OB PROVIDER DELIVERY SUMMARY - NSPROVIDERDELIVERYNOTE_OBGYN_ALL_OB_FT
OVER INTACT PERINEUM  DELIVERED A LIVE FEMALE BABY FROM OA POSITION  CORD AROUND BODY  LARGE AMOUNT OF AF NOTED WITH DELIVERY  DELIVERY UN-EVENTFUL  DELAYED CORD CLAMP DONE  CORD GASES SENT  PEDS IN THE ROOM DUE TO MATERNAL FEVER AND FETAL TACHYCARDIA  PLACENTA DELIVERED SPONT AND INTACT/COMPLETE--SENT TO PATH DUE TO FEVER  FIRST DEGREE VAGINAL AND ALSO WILMER-URETHRAL LACERATIONS REPAIRED WITH 2-0 AND 3-0 CHROMIC SUTURES  FUNDUS FIRM WITH NORMAL LOCHIA  BABY REGULAR NURSERY

## 2022-12-15 NOTE — OB PROVIDER LABOR PROGRESS NOTE - ASSESSMENT
A/P: 37y P0 admitted for eIOL  - pt now s/p CRB   - will transition from VC to pitocin 2x2 for induction agent   - continuous monitoring/toco   - epi for pain   - maternal repositioning/fluids prn for resuscitation   - transfer to LDR when active     d/w Dr. Aracelis De Jesus PGY2
Patient with cervical change  c/w pitocin  anticipate     Discussed with Dr. Aracelis Lim PGY1
Patient with cervical change  s/p SROM  c/w pitocin    Discussed with Dr. Aracelis Lim PGY1

## 2022-12-15 NOTE — OB PROVIDER LABOR PROGRESS NOTE - NS_SUBJECTIVE/OBJECTIVE_OBGYN_ALL_OB_FT
PGY2 Labor Note    Patient seen and evaluated at bedside for placement of VC.  Denies complaints.  Comfortable w/ epidural.      T(C): 36.7 (12-14-22 @ 14:00), Max: 36.7 (12-13-22 @ 22:00)  HR: 87 (12-14-22 @ 18:03) (68 - 95)  BP: 95/61 (12-14-22 @ 18:01) (81/49 - 116/65)  RR: --  SpO2: 98% (12-14-22 @ 18:03) (85% - 100%)
Patient examined for cervical change s/p SROM@11pm, clear fluid per RN  Patient still endorsing watery diarrhea
Patient examined for cervical change

## 2022-12-16 ENCOUNTER — TRANSCRIPTION ENCOUNTER (OUTPATIENT)
Age: 37
End: 2022-12-16

## 2022-12-16 VITALS
OXYGEN SATURATION: 100 % | TEMPERATURE: 98 F | SYSTOLIC BLOOD PRESSURE: 122 MMHG | DIASTOLIC BLOOD PRESSURE: 66 MMHG | RESPIRATION RATE: 18 BRPM | HEART RATE: 101 BPM

## 2022-12-16 LAB
BASOPHILS # BLD AUTO: 0.03 K/UL — SIGNIFICANT CHANGE UP (ref 0–0.2)
BASOPHILS NFR BLD AUTO: 0.3 % — SIGNIFICANT CHANGE UP (ref 0–2)
EOSINOPHIL # BLD AUTO: 0.17 K/UL — SIGNIFICANT CHANGE UP (ref 0–0.5)
EOSINOPHIL NFR BLD AUTO: 1.4 % — SIGNIFICANT CHANGE UP (ref 0–6)
HCT VFR BLD CALC: 27 % — LOW (ref 34.5–45)
HGB BLD-MCNC: 8.8 G/DL — LOW (ref 11.5–15.5)
IANC: 8.47 K/UL — HIGH (ref 1.8–7.4)
IMM GRANULOCYTES NFR BLD AUTO: 0.4 % — SIGNIFICANT CHANGE UP (ref 0–0.9)
LYMPHOCYTES # BLD AUTO: 2.57 K/UL — SIGNIFICANT CHANGE UP (ref 1–3.3)
LYMPHOCYTES # BLD AUTO: 21.7 % — SIGNIFICANT CHANGE UP (ref 13–44)
MCHC RBC-ENTMCNC: 29 PG — SIGNIFICANT CHANGE UP (ref 27–34)
MCHC RBC-ENTMCNC: 32.6 GM/DL — SIGNIFICANT CHANGE UP (ref 32–36)
MCV RBC AUTO: 89.1 FL — SIGNIFICANT CHANGE UP (ref 80–100)
MONOCYTES # BLD AUTO: 0.53 K/UL — SIGNIFICANT CHANGE UP (ref 0–0.9)
MONOCYTES NFR BLD AUTO: 4.5 % — SIGNIFICANT CHANGE UP (ref 2–14)
NEUTROPHILS # BLD AUTO: 8.47 K/UL — HIGH (ref 1.8–7.4)
NEUTROPHILS NFR BLD AUTO: 71.7 % — SIGNIFICANT CHANGE UP (ref 43–77)
NRBC # BLD: 0 /100 WBCS — SIGNIFICANT CHANGE UP (ref 0–0)
NRBC # FLD: 0 K/UL — SIGNIFICANT CHANGE UP (ref 0–0)
PLATELET # BLD AUTO: 192 K/UL — SIGNIFICANT CHANGE UP (ref 150–400)
RBC # BLD: 3.03 M/UL — LOW (ref 3.8–5.2)
RBC # FLD: 13.5 % — SIGNIFICANT CHANGE UP (ref 10.3–14.5)
WBC # BLD: 11.82 K/UL — HIGH (ref 3.8–10.5)
WBC # FLD AUTO: 11.82 K/UL — HIGH (ref 3.8–10.5)

## 2022-12-16 RX ORDER — ASPIRIN/CALCIUM CARB/MAGNESIUM 324 MG
0 TABLET ORAL
Qty: 0 | Refills: 0 | DISCHARGE

## 2022-12-16 RX ORDER — ASCORBIC ACID 60 MG
500 TABLET,CHEWABLE ORAL DAILY
Refills: 0 | Status: DISCONTINUED | OUTPATIENT
Start: 2022-12-16 | End: 2022-12-16

## 2022-12-16 RX ORDER — ACETAMINOPHEN 500 MG
3 TABLET ORAL
Qty: 0 | Refills: 0 | DISCHARGE
Start: 2022-12-16

## 2022-12-16 RX ORDER — SENNA PLUS 8.6 MG/1
1 TABLET ORAL
Refills: 0 | Status: DISCONTINUED | OUTPATIENT
Start: 2022-12-16 | End: 2022-12-16

## 2022-12-16 RX ORDER — FERROUS SULFATE 325(65) MG
325 TABLET ORAL THREE TIMES A DAY
Refills: 0 | Status: DISCONTINUED | OUTPATIENT
Start: 2022-12-16 | End: 2022-12-16

## 2022-12-16 RX ORDER — IBUPROFEN 200 MG
1 TABLET ORAL
Qty: 0 | Refills: 0 | DISCHARGE
Start: 2022-12-16

## 2022-12-16 RX ADMIN — Medication 600 MILLIGRAM(S): at 00:00

## 2022-12-16 RX ADMIN — Medication 600 MILLIGRAM(S): at 00:30

## 2022-12-16 RX ADMIN — Medication 600 MILLIGRAM(S): at 09:05

## 2022-12-16 RX ADMIN — Medication 600 MILLIGRAM(S): at 09:45

## 2022-12-16 NOTE — DISCHARGE NOTE OB - CARE PLAN
1 Principal Discharge DX:	Normal spontaneous vaginal delivery  Assessment and plan of treatment:	Pelvic rest x6 weeks. Postpartum in 6 weeks

## 2022-12-16 NOTE — DISCHARGE NOTE OB - HOSPITAL COURSE
12/15/22, complicated by chorio  12/15/22, complicated by chorio, treated  d/c home stable on PPD#1

## 2022-12-16 NOTE — DISCHARGE NOTE OB - HAVE YOU HAD COVID IN THE LAST 60 DAYS?
A 24 hour ePatch was placed today per Wilmar Cruz MD for Diagnosis: CAD. Aidan CANDE Mccurdyizaiah was given written and verbal instructions regarding the monitor and diary. Removal of the monitor will occur on 11/17/2020 at 0910 a.m.   No

## 2022-12-16 NOTE — PROGRESS NOTE ADULT - SUBJECTIVE AND OBJECTIVE BOX
S: 38yo  PPD#2 s/p , c/b chorio. Patient feels well. Pain is well controlled. She is tolerating a regular diet and passing flatus. She is voiding spontaneously, and ambulating without difficulty. Denies CP, SOB, lightheadedness, dizziness, N/V. Patient desires early discharge today due to observation of Sabbath.    O:  Vital Signs Last 24 Hrs  T(C): 36.5 (16 Dec 2022 10:00), Max: 37.1 (16 Dec 2022 05:34)  T(F): 97.7 (16 Dec 2022 10:00), Max: 98.7 (16 Dec 2022 05:34)  HR: 101 (16 Dec 2022 10:00) (81 - 101)  BP: 122/66 (16 Dec 2022 10:00) (94/55 - 122/66)  BP(mean): --  RR: 18 (16 Dec 2022 10:00) (18 - 20)  SpO2: 100% (16 Dec 2022 10:00) (99% - 100%)    Parameters below as of 16 Dec 2022 10:00  Patient On (Oxygen Delivery Method): room air      MEDICATIONS  (STANDING):  acetaminophen     Tablet .. 975 milliGRAM(s) Oral <User Schedule>  ascorbic acid 500 milliGRAM(s) Oral daily  diphtheria/tetanus/pertussis (acellular) Vaccine (Adacel) 0.5 milliLiter(s) IntraMuscular once  ferrous    sulfate 325 milliGRAM(s) Oral three times a day  ibuprofen  Tablet. 600 milliGRAM(s) Oral every 6 hours  prenatal multivitamin 1 Tablet(s) Oral daily  senna 1 Tablet(s) Oral two times a day  sodium chloride 0.9% lock flush 3 milliLiter(s) IV Push every 8 hours    MEDICATIONS  (PRN):  benzocaine 20%/menthol 0.5% Spray 1 Spray(s) Topical every 6 hours PRN for Perineal discomfort  dibucaine 1% Ointment 1 Application(s) Topical every 6 hours PRN Perineal discomfort  diphenhydrAMINE 25 milliGRAM(s) Oral every 6 hours PRN Pruritus  hydrocortisone 1% Cream 1 Application(s) Topical every 6 hours PRN Moderate Pain (4-6)  lanolin Ointment 1 Application(s) Topical every 6 hours PRN nipple soreness  magnesium hydroxide Suspension 30 milliLiter(s) Oral two times a day PRN Constipation  oxyCODONE    IR 5 milliGRAM(s) Oral every 3 hours PRN Moderate to Severe Pain (4-10)  oxyCODONE    IR 5 milliGRAM(s) Oral once PRN Moderate to Severe Pain (4-10)  pramoxine 1%/zinc 5% Cream 1 Application(s) Topical every 4 hours PRN Moderate Pain (4-6)  simethicone 80 milliGRAM(s) Chew every 4 hours PRN Gas  witch hazel Pads 1 Application(s) Topical every 4 hours PRN Perineal discomfort      Labs:  Blood type: AB Negative  Rubella IgG: RPR: Negative                          8.8<L>   11.82<H> >-----------< 192    ( 12-16 @ 04:49 )             27.0<L>    - @ 23:00      136  |  106  |  4<L>  ----------------------------<  89  3.7   |  22  |  0.51        Ca    8.7      14 Dec 2022 23:00  Phos  3.4     12-14  Mg     1.70           Physical Exam:  General: NAD  Abdomen: soft, non-tender, non-distended. Fundus firm  Vaginal: Lochia light  Extremities: No erythema, edema, or calf tenderness    A/P: 38yo  PPD#2 s/p  c/b chorio. Patient is stable and doing well post-partum.     #Chorio  -s/p Ampicillin, Gentamicin, Tylenol  -afebrile, VSS  -WBC 11.82    #Postpartum  -Pain well controlled, continue current pain regimen  -Increase ambulation  -Continue regular diet  -Discussed with Dr. Caban patient's desire for early discharge    Eliza MILLER-BC
38 y/o P2 s/p  PPD#1  IOL for AMA  complicated by intrapartum chorio, treated w antibiotics  no further fevers    today denies SOB, chest pain, fever or chills  reports lochia is minimal  some vaginal soreness  some lower extremity swelling    vitals    /66 P 101 RR 18 T 97.7 O2sat 100%RA  cardio ns1s2  lungs CTAB  abdomen- appropriate distention  fundus firm/non tender  Lext +2 edema/bilat, non tender    PPD#1 CBC  wbc 11, h/h 8.8/27, ptl 1921    A/P  38 y/o P2 s/p  complicated by chorio/treated, acute on chronic anemia- asymptomatic    d/c home  f/up in office for PP visit 4-6 weeks or sooner  PIH precautions

## 2022-12-16 NOTE — DISCHARGE NOTE OB - PATIENT PORTAL LINK FT
You can access the FollowMyHealth Patient Portal offered by [facility] by registering at the following website: [email or web address]. By joining [facility]’s FollowMyHealth portal, you will also be able to view your health information using other applications (apps) compatible with our system.

## 2022-12-16 NOTE — DISCHARGE NOTE OB - CARE PROVIDER_API CALL
Hui Thakur  OBSTETRICS AND GYNECOLOGY  [address] [place] [address]  Phone: [phone]  Fax: [phone]  Follow Up Time:

## 2022-12-16 NOTE — DISCHARGE NOTE OB - MEDICATION SUMMARY - MEDICATIONS TO STOP TAKING
I will STOP taking the medications listed below when I get home from the hospital:    Low Dose ASA 81 mg oral tablet

## 2022-12-16 NOTE — DISCHARGE NOTE OB - MATERIALS PROVIDED
[facility] [place] Screening Program/[place]  Immunization Record/Breastfeeding Log/Breastfeeding Mother’s Support Group Information/Guide to Postpartum Care/[facility] Hearing Screen Program/Shaken Baby Prevention Handout/Breastfeeding Guide and Packet/Birth Certificate Instructions/Discharge Medication Information for Patients and Families Pocket Guide

## 2023-01-07 LAB — SURGICAL PATHOLOGY STUDY: SIGNIFICANT CHANGE UP

## 2023-04-26 PROBLEM — M51.26 OTHER INTERVERTEBRAL DISC DISPLACEMENT, LUMBAR REGION: Chronic | Status: ACTIVE | Noted: 2022-12-13

## 2023-05-02 ENCOUNTER — APPOINTMENT (OUTPATIENT)
Dept: ORTHOPEDIC SURGERY | Facility: CLINIC | Age: 38
End: 2023-05-02
Payer: COMMERCIAL

## 2023-05-02 DIAGNOSIS — M65.4 RADIAL STYLOID TENOSYNOVITIS [DE QUERVAIN]: ICD-10-CM

## 2023-05-02 DIAGNOSIS — G56.02 CARPAL TUNNEL SYNDROME, LEFT UPPER LIMB: ICD-10-CM

## 2023-05-02 PROCEDURE — 99203 OFFICE O/P NEW LOW 30 MIN: CPT

## 2023-05-02 NOTE — IMAGING
[de-identified] : LEFT HAND\par skin intact. no swelling.\par TTP to 1st ext compartment.\par good wrist extension, flexion.\par good EPL, FPL. good finger extension, flex to full fist. good finger abduction and adduction. \par tingling of IF/MF/RF. SILT other digits, dorsal 1st webspace.\par palpable radial pulse, brisk cap refill all digits.\par  4/5, 1st DI 5/5, APB 4+/5 with pain.\par no triggering.\par negative Tinel's at carpal tunnel.\par no ulnar nerve subluxation at the elbow. negative Tinel's at cubital tunnel.\par \par \par OUTSIDE XRAYS OF LEFT WRIST @4/11/23: no acute displaced fracture or dislocation.

## 2023-05-02 NOTE — ASSESSMENT
[FreeTextEntry1] : The condition was explained to the patient.\par \par Discussed risks and benefits of treatment options for tenosynovitis - activity modification, NSAID, splint, steroid injection, or surgery.\par Patient declined CSI at this time, will consider it for the future.\par \par Patient will obtain prior EDX and call to schedule f/u appointment once she has the report.\par Continue carpal tunnel night splint in the interim.
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
## 2023-05-03 NOTE — OB RN TRIAGE NOTE - TERM DELIVERIES, OB PROFILE
-- DO NOT REPLY / DO NOT REPLY ALL --  -- Message is from Engagement Center Operations (EC[facility]) --    Patient has called to Cancel or Reschedule their upcoming appointment. Please contact patient as needed.     Existing appointment date / time: 05/05/23 @ 10 Am     Provider: Dr. Tai Holguin     Appointment Cancelled    Caller Information       Type Contact Phone/Fax    05/03/2023 01:21 PM CDT Phone (Incoming) Gabrielle Cornejo (Self) [phone] (M)          Patient requests callback: No   Callback phone number: [phone]    Can a detailed message be left? Yes    Message Turnaround:     [place]:    Please give this turnaround time to the caller:   \"This message will be sent to [state Provider's name]. The clinical team will fulfill your request as soon as they review your message.\"   0

## 2023-05-08 ENCOUNTER — TRANSCRIPTION ENCOUNTER (OUTPATIENT)
Age: 38
End: 2023-05-08

## 2023-05-08 ENCOUNTER — APPOINTMENT (OUTPATIENT)
Dept: SURGERY | Facility: CLINIC | Age: 38
End: 2023-05-08
Payer: COMMERCIAL

## 2023-05-08 VITALS
WEIGHT: 165.4 LBS | HEIGHT: 62 IN | BODY MASS INDEX: 30.44 KG/M2 | SYSTOLIC BLOOD PRESSURE: 106 MMHG | HEART RATE: 77 BPM | DIASTOLIC BLOOD PRESSURE: 72 MMHG

## 2023-05-08 DIAGNOSIS — Z78.9 OTHER SPECIFIED HEALTH STATUS: ICD-10-CM

## 2023-05-08 DIAGNOSIS — G43.909 MIGRAINE, UNSPECIFIED, NOT INTRACTABLE, W/OUT STATUS MIGRAINOSUS: ICD-10-CM

## 2023-05-08 DIAGNOSIS — N64.4 MASTODYNIA: ICD-10-CM

## 2023-05-08 PROCEDURE — 99203 OFFICE O/P NEW LOW 30 MIN: CPT

## 2023-05-08 NOTE — ASSESSMENT
[FreeTextEntry1] : Ms. SHIN is a 38 year y/o F who is 5 month PP, presently breastfeeding, presents for breast evaluation. On exam, no suspicious findings noted. Most recent imaging reviewed.

## 2023-05-08 NOTE — PLAN
[FreeTextEntry1] : recommended 6 month follow up BL breast sonogram \par monitor /observe for any changes\par surgical intervention not recommended at this time\par \par Patient's questions and concerns addressed to their satisfaction, and patient verbalized an understanding of the information discussed.\par

## 2023-05-08 NOTE — DATA REVIEWED
[FreeTextEntry1] : \par Patient: ANN MARIE SHIN\par YOB: 1985\par Phone: [phone]\par MRN: [id number] Acc: [id number]\par Date of Exam: 10-\par  \par EXAM: ULTRASOUND BREAST BILATERAL COMPLETE\par \par HISTORY: The patient is 37 years old and is seen for evaluation of bilateral palpable nodularity Family history of breast cancer: None.\par \par TECHNIQUE: A bilateral breast ultrasound was performed with complete evaluation of the four quadrants, retroareolar regions, and axillae.\par \par COMPARISON: None.\par \par FINDINGS:\par \par There is a 0.8 x 0.7 x 0.7 cm superficial circumscribed hypoechoic nodule in the 3 o'clock position of the right breast, 1 cm from the nipple which likely represents a cyst with inspissated debris that corresponds to an area palpable concern reported by the patient. This nodule was also described in the clinical referral..\par \par There is a 0.6 x 0.6 x 0.5 cm indeterminate hypoechoic nodule with partially circumscribed and partially indistinct margins in the 3 o'clock position of left breast, 1 cm from the nipple which corresponds to a region of palpable concern reported by the patient..\par \par No suspicious solid or complex cystic mass is detected in the remainder of either breast. There is no significant lymphadenopathy in the axillae.\par \par IMPRESSION:\par 1. Indeterminate nodule in the 3 o'clock position of the left breast, 1 cm from the nipple. Ultrasound guided biopsy is recommended.\par 2. Probably benign nodule in the 3 o'clock position of the right breast. Six-month follow-up targeted ultrasound is recommended.\par \par Further management of palpable nodularity should be based on clinical assessment. \par \par Diagnostic mammography prior to performing the biopsy is suggested. The office staff has discussed the recommendation for biopsy and mammography with the patient. The patient has an appointment for the biopsy on 10/26/2022.\par \par FOLLOW-UP: Ultrasound guided biopsy.\par \par ASSESSMENT: BI-RADS Category 4:  Suspicious.

## 2023-05-08 NOTE — CONSULT LETTER
[Dear  ___] : Dear  [unfilled], [Consult Letter:] : I had the pleasure of evaluating your patient, [unfilled]. [Consult Closing:] : Thank you very much for allowing me to participate in the care of this patient.  If you have any questions, please do not hesitate to contact me. [Sincerely,] : Sincerely, [FreeTextEntry3] : Sohan George MD\par

## 2023-05-08 NOTE — REVIEW OF SYSTEMS
[Breast Pain] : breast pain [Breast Lump] : breast lump [Fever] : no fever [Chills] : no chills [Chest Pain] : no chest pain [Lower Ext Edema] : no lower extremity edema [Shortness Of Breath] : no shortness of breath [Cough] : no cough [Abdominal Pain] : no abdominal pain [Pelvic Pain] : no pelvic pain [Skin Lesions] : no skin lesions [Skin Wound] : no skin wound [Dizziness] : no dizziness [Anxiety] : no anxiety [Muscle Weakness] : no muscle weakness [Swollen Glands] : no swollen glands

## 2023-05-08 NOTE — HISTORY OF PRESENT ILLNESS
[de-identified] : ANN MARIE SHIN is a 38 year old F who is referred to the office for consultation visit, she presents w the cc of having a palpable finding w pain that comes and goes to the right breast. Presently, she is breast feeding, and 5 months PP. . \par Family history of breast CA, includes grandmother, diagnosed age 70s. Moms aunt, diagnosed w Breast CA, age 60s-70s. \par Patient had gone to [facility], during the time of her pregnancy and had a BL breast mammo, 10/24/22. She states she was uncomfortable w her experience and had gone to [facility] and had BL breast mammo done there on the same day.

## 2023-05-08 NOTE — PHYSICAL EXAM
[No Rash or Lesion] : No rash or lesion [Alert] : alert [Oriented to Person] : oriented to person [Oriented to Place] : oriented to place [Oriented to Time] : oriented to time [Calm] : calm [de-identified] : A/Ox3; NAD. appears comfortable [de-identified] : no cervical lymphadenopathy  [de-identified] : EOMI; sclera anicteric. [de-identified] : airway patent, no use of accessory muscles [de-identified] : small palpable pea sized nodule to the right inner breast, no masses felt to either side, no tenderness, no axillary lymphadenopathy, no skin thickening  [de-identified] : abd is soft, NT/ND\par

## 2023-09-20 NOTE — DISCHARGE NOTE OB - CONTRAINDICATIONS & PRECAUTIONS (SELECT ALL THAT APPLY)
Patient/surrogate refused vaccine...

## 2024-06-09 ENCOUNTER — EMERGENCY (EMERGENCY)
Facility: HOSPITAL | Age: 39
LOS: 1 days | Discharge: HOME | End: 2024-06-09
Admitting: EMERGENCY MEDICINE
Payer: COMMERCIAL

## 2024-06-09 DIAGNOSIS — Z98.890 OTHER SPECIFIED POSTPROCEDURAL STATES: Chronic | ICD-10-CM

## 2024-06-09 PROCEDURE — 99284 EMERGENCY DEPT VISIT MOD MDM: CPT

## 2024-06-09 PROCEDURE — 71046 X-RAY EXAM CHEST 2 VIEWS: CPT | Mod: 26

## 2024-06-09 PROCEDURE — 72110 X-RAY EXAM L-2 SPINE 4/>VWS: CPT | Mod: 26

## 2024-06-09 RX ORDER — IBUPROFEN 200 MG
1 TABLET ORAL
Qty: 10 | Refills: 0
Start: 2024-06-09

## 2024-06-09 RX ORDER — METHOCARBAMOL 500 MG/1
1 TABLET, FILM COATED ORAL
Qty: 8 | Refills: 0
Start: 2024-06-09

## 2024-06-09 RX ORDER — LIDOCAINE 4 G/100G
1 CREAM TOPICAL
Qty: 1 | Refills: 0
Start: 2024-06-09

## 2024-06-09 NOTE — ED PROVIDER NOTE - PROGRESS NOTE DETAILS
FELIX Newell: pt reassessed, reports feeling improved, no acute fracture on XR imaging  will d/c with muscle relaxer, nsaid, lidocaine patch, strict return precautions
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
## 2024-06-09 NOTE — ED PROVIDER NOTE - PATIENT PORTAL LINK FT
You can access the FollowMyHealth Patient Portal offered by [facility] by registering at the following website: [email or web address]. By joining [facility]’s FollowMyHealth portal, you will also be able to view your health information using other applications (apps) compatible with our system.

## 2024-06-09 NOTE — ED PROVIDER NOTE - PHYSICAL EXAMINATION
strength, sensation and pulses full and symmetric in all extremities   normal gait   no ecchymosis to chest wall
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
## 2024-06-10 VITALS
OXYGEN SATURATION: 99 % | DIASTOLIC BLOOD PRESSURE: 70 MMHG | HEART RATE: 85 BPM | RESPIRATION RATE: 19 BRPM | SYSTOLIC BLOOD PRESSURE: 114 MMHG | TEMPERATURE: 98 F
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
## 2024-06-10 NOTE — ED ADULT NURSE REASSESSMENT NOTE - NS ED NURSE REASSESS COMMENT FT1
Upon reassessment pt A&Ox3 sitting up in stretcher resting comfortably. Respirations even and unlabored on room air. No acute distress noted. Denies headache, dizziness, chest pain and SOB at this time. FELIX POTTS at bedside discharging pt. VS as noted in flow sheet. IV discontinued at this time. Pt ambulated out of ED with steady gait to home in private car.

## 2024-11-27 NOTE — OB PROVIDER TRIAGE NOTE - NSRISKFACTORS_OBGYN_ALL_OB
Patient Seen in: [facility]      History     Chief Complaint   Patient presents with    Hand Injury            Stated Complaint: hand pain/fall/wc    Subjective:   28 yo female presents to the immediate care with c/o left hand injury.  Patient states she was coming out of the bathroom and was checking her phone.  She isn't sure if she hit something, but she ended up tripping and falling.  When she fell she fell backwards and onto her left hand/wrist.  She has had pain to the palm of her left hand since this happened.  She has been putting ice on it, but has not taken anything for pain prior to arrival.  This occurred at work.  She denies any head injury, LOC, numbness or tingling.     The history is provided by the patient.         Objective:     Past Medical History:    Anxiety    Anxiety disorder, unspecified    Cannabis use disorder, severe, dependence (HCC)    Depression    Fibroid    Gestational hypertension (HCC)    at 37+ wk.    Hyperlipidemia    MDD (major depressive disorder), recurrent, severe, with psychosis (HCC)    Morbid obesity with BMI of 40.0-44.9, adult (HCC)    [X] MFM US  Monthly growth ultrasounds starting at 28 weeks, add BPP to each growth ultrasound at 32 weeks and beyond  Weekly NSTs at 34 weeks  Limit weight gain to 11-20 pounds.  Delivery at 39-40 weeks        Nonepileptic episode (HCC)    Obesity (BMI 30-39.9)    Psychosis (HCC)    Schizo affective schizophrenia (HCC)    Shaking    Suicidal behavior with attempted self-injury (HCC)              History reviewed. No pertinent surgical history.             Social History     Socioeconomic History    Marital status: Single   Tobacco Use    Smoking status: Never     Passive exposure: Never    Smokeless tobacco: Never   Vaping Use    Vaping status: Never Used   Substance and Sexual Activity    Alcohol use: Not Currently     Comment: occasional    Drug use: Not Currently     Types: Cannabis     Comment: occasional    Sexual  activity: Not Currently     Partners: Male   Other Topics Concern    Caffeine Concern Yes    Exercise No     Social Drivers of Health     Financial Resource Strain: Medium Risk (2/4/2024)    Financial Resource Strain     Difficulty of Paying Living Expenses: Hard     Med Affordability: No   Food Insecurity: No Food Insecurity (2/4/2024)    Food Insecurity     Food Insecurity: Never true   Recent Concern: Food Insecurity - Food Insecurity Present (2/4/2024)    [facility], [facility]    Food Insecurity     Currently or in the past 3 months, have you worried your food would run out before you had money to buy more?: Yes     In the past 12 months, have you run out of food or been unable to get more?: No   Transportation Needs: No Transportation Needs (2/4/2024)    Transportation Needs     Lack of Transportation: No   Stress: No Stress Concern Present (2/4/2024)    Stress     Feeling of Stress : No   Housing Stability: Low Risk  (2/4/2024)    Housing Stability     Housing Instability: No              Review of Systems   Constitutional: Negative.    Musculoskeletal:  Positive for arthralgias and joint swelling.   All other systems reviewed and are negative.      Positive for stated complaint: hand pain/fall/wc  Other systems are as noted in HPI.  Constitutional and vital signs reviewed.      All other systems reviewed and negative except as noted above.    Physical Exam     ED Triage Vitals [11/26/24 1924]   /85   Pulse 104   Resp 18   Temp 98 °F (36.7 °C)   Temp src    SpO2 98 %   O2 Device        Current Vitals:   Vital Signs  BP: 134/85  Pulse: 104  Resp: 18  Temp: 98 °F (36.7 °C)    Oxygen Therapy  SpO2: 98 %        Physical Exam  Vitals and nursing note reviewed.   Constitutional:       General: She is not in acute distress.     Appearance: Normal appearance. She is obese. She is not ill-appearing.   HENT:      Head: Normocephalic and atraumatic.       Mouth/Throat:      Mouth: Mucous membranes are moist.      Pharynx: Oropharynx is clear.   Eyes:      Conjunctiva/sclera: Conjunctivae normal.   Cardiovascular:      Rate and Rhythm: Normal rate and regular rhythm.      Pulses: Normal pulses.      Heart sounds: Normal heart sounds.   Pulmonary:      Effort: Pulmonary effort is normal. No respiratory distress.      Breath sounds: Normal breath sounds.   Musculoskeletal:         General: Swelling, tenderness and signs of injury present. No deformity. Normal range of motion.      Comments: Tenderness with palpation of the proximal metacarpals.  There is mild edema to this area.  No obvious deformity or dislocation.  ROM is limited due to pain.  Motor strength and sensation intact.  Cap refill <2 sec and radial pulse is 2+.  No pain to the wrist or digits.    Skin:     General: Skin is warm and dry.      Capillary Refill: Capillary refill takes less than 2 seconds.   Neurological:      General: No focal deficit present.      Mental Status: She is alert and oriented to person, place, and time.   Psychiatric:         Mood and Affect: Mood normal.         Behavior: Behavior normal.           ED Course   Labs Reviewed - No data to display         MDM        Medical Decision Making   28 yo female with left hand injury from a fall.  X-ray ordered.      X-rays read by radiology shows a nondisplaced fracture of the distal phalanx of the first digit.  Will place patient in a thumb spica wrist splint as I feel this will be the most comfortable for immobilization for patient.  Patient is neurovascularly intact.  She can take Tylenol and/or ibuprofen as needed for pain control.  Patient referred to occupational health for follow-up.  Note given for work restrictions.    Amount and/or Complexity of Data Reviewed  Radiology: ordered. Decision-making details documented in ED Course.    Risk  OTC drugs.        Disposition and Plan     Clinical Impression:  1. Closed nondisplaced  fracture of distal phalanx of left thumb, initial encounter         Disposition:  Discharge  11/26/2024  8:03 pm    Follow-up:  [place] [address], [address]  [facility] [id number] [phone]  In 3 days            Medications Prescribed:  Current Discharge Medication List              Supplementary Documentation:                                                            Unknown at this time